# Patient Record
Sex: MALE
[De-identification: names, ages, dates, MRNs, and addresses within clinical notes are randomized per-mention and may not be internally consistent; named-entity substitution may affect disease eponyms.]

---

## 2022-12-19 ENCOUNTER — NURSE TRIAGE (OUTPATIENT)
Dept: OTHER | Facility: CLINIC | Age: 62
End: 2022-12-19

## 2022-12-19 NOTE — TELEPHONE ENCOUNTER
Location of patient: [place]    Subjective: Caller states \"both of my ears are clogged. I think it is wax. It is effecting my hearing and sometimes I get ringing in my ears\"   Left ear more full than right  Current Symptoms: ear fullness, ringing    Onset: 2 weeks ago;     Associated Symptoms:     Pain Severity: denies pain    Temperature:  denies fever    What has been tried:     LMP: NA Pregnant: NA    Recommended disposition: See in Office Within 2 Weeks    Care advice provided, patient verbalizes understanding; denies any other questions or concerns; instructed to call back for any new or worsening symptoms. Warm transfer provided to [facility]  Jesus  for further assistance finding ENT in area covered by insurance    This triage is a result of a call to [address] [place] [address]. Please do not respond to the triage nurse through this encounter. Any subsequent communication should be directly with the patient.     Reason for Disposition   Earwax problem and no improvement using CARE ADVICE    Protocols used: Earwax-ADULT-[place]